# Patient Record
Sex: FEMALE | Race: AMERICAN INDIAN OR ALASKA NATIVE
[De-identification: names, ages, dates, MRNs, and addresses within clinical notes are randomized per-mention and may not be internally consistent; named-entity substitution may affect disease eponyms.]

---

## 2017-11-06 ENCOUNTER — HOSPITAL ENCOUNTER (EMERGENCY)
Dept: HOSPITAL 5 - ED | Age: 27
Discharge: HOME | End: 2017-11-06
Payer: SELF-PAY

## 2017-11-06 VITALS — DIASTOLIC BLOOD PRESSURE: 68 MMHG | SYSTOLIC BLOOD PRESSURE: 100 MMHG

## 2017-11-06 DIAGNOSIS — F12.10: ICD-10-CM

## 2017-11-06 DIAGNOSIS — R82.5: ICD-10-CM

## 2017-11-06 DIAGNOSIS — E87.6: ICD-10-CM

## 2017-11-06 DIAGNOSIS — E05.90: ICD-10-CM

## 2017-11-06 DIAGNOSIS — B34.9: Primary | ICD-10-CM

## 2017-11-06 LAB
ALBUMIN SERPL-MCNC: 4.2 G/DL (ref 3.9–5)
ALBUMIN/GLOB SERPL: 1.1 %
ALP SERPL-CCNC: 63 UNITS/L (ref 35–129)
ALT SERPL-CCNC: 11 UNITS/L (ref 7–56)
ANION GAP SERPL CALC-SCNC: 21 MMOL/L
BACTERIA #/AREA URNS HPF: (no result) /HPF
BASOPHILS NFR BLD AUTO: 0.5 % (ref 0–1.8)
BILIRUB DIRECT SERPL-MCNC: 0.2 MG/DL (ref 0–0.2)
BILIRUB INDIRECT SERPL-MCNC: 0.9 MG/DL
BILIRUB SERPL-MCNC: 1.1 MG/DL (ref 0.1–1.2)
BILIRUB UR QL STRIP: (no result)
BLOOD UR QL VISUAL: (no result)
BUN SERPL-MCNC: 7 MG/DL (ref 7–17)
BUN/CREAT SERPL: 9 %
CALCIUM SERPL-MCNC: 9.2 MG/DL (ref 8.4–10.2)
CHLORIDE SERPL-SCNC: 95.7 MMOL/L (ref 98–107)
CO2 SERPL-SCNC: 23 MMOL/L (ref 22–30)
EOSINOPHIL NFR BLD AUTO: 0.1 % (ref 0–4.3)
GLUCOSE SERPL-MCNC: 109 MG/DL (ref 65–100)
HCT VFR BLD CALC: 36.3 % (ref 30.3–42.9)
HGB BLD-MCNC: 11.9 GM/DL (ref 10.1–14.3)
KETONES UR STRIP-MCNC: (no result) MG/DL
LEUKOCYTE ESTERASE UR QL STRIP: (no result)
MCH RBC QN AUTO: 30 PG (ref 28–32)
MCHC RBC AUTO-ENTMCNC: 33 % (ref 30–34)
MCV RBC AUTO: 90 FL (ref 79–97)
NITRITE UR QL STRIP: (no result)
PH UR STRIP: 6 [PH] (ref 5–7)
PLATELET # BLD: 257 K/MM3 (ref 140–440)
POTASSIUM SERPL-SCNC: 3.3 MMOL/L (ref 3.6–5)
PROT SERPL-MCNC: 8.1 G/DL (ref 6.3–8.2)
RBC # BLD AUTO: 4.03 M/MM3 (ref 3.65–5.03)
RBC #/AREA URNS HPF: 13 /HPF (ref 0–6)
SODIUM SERPL-SCNC: 136 MMOL/L (ref 137–145)
UROBILINOGEN UR-MCNC: 4 MG/DL (ref ?–2)
WBC # BLD AUTO: 14.6 K/MM3 (ref 4.5–11)
WBC #/AREA URNS HPF: > 182 /HPF (ref 0–6)

## 2017-11-06 PROCEDURE — 93005 ELECTROCARDIOGRAM TRACING: CPT

## 2017-11-06 PROCEDURE — 36415 COLL VENOUS BLD VENIPUNCTURE: CPT

## 2017-11-06 PROCEDURE — 80048 BASIC METABOLIC PNL TOTAL CA: CPT

## 2017-11-06 PROCEDURE — 71020: CPT

## 2017-11-06 PROCEDURE — 80074 ACUTE HEPATITIS PANEL: CPT

## 2017-11-06 PROCEDURE — 93010 ELECTROCARDIOGRAM REPORT: CPT

## 2017-11-06 PROCEDURE — 87400 INFLUENZA A/B EACH AG IA: CPT

## 2017-11-06 PROCEDURE — 81001 URINALYSIS AUTO W/SCOPE: CPT

## 2017-11-06 PROCEDURE — 96374 THER/PROPH/DIAG INJ IV PUSH: CPT

## 2017-11-06 PROCEDURE — 99284 EMERGENCY DEPT VISIT MOD MDM: CPT

## 2017-11-06 PROCEDURE — 96361 HYDRATE IV INFUSION ADD-ON: CPT

## 2017-11-06 PROCEDURE — 81025 URINE PREGNANCY TEST: CPT

## 2017-11-06 PROCEDURE — 85025 COMPLETE CBC W/AUTO DIFF WBC: CPT

## 2017-11-06 NOTE — EMERGENCY DEPARTMENT REPORT
- General


Chief Complaint: Upper Respiratory Infection


Stated Complaint: FLU LIKE SYMPTOMS


Time Seen by Provider: 17 15:49


Source: patient


Mode of arrival: Ambulatory


Limitations: No Limitations





- History of Present Illness


Initial Comments: 





27-year-old female with a past medical history of hyperthyroidism (subclinical 

and not currently on medication) presents to the hospital with complaints of 

flulike symptoms for the past 4-5 days.  Positive cough productive of yellow 

sputum, intermittent fevers, decreased food intake for the past 2 days.  

Yesterday patient had an episode of profuse sweating and near-syncope.  She 

does not obtain a sneeze at this time.  She complains of anterior chest pain 

with coughing that improved at the taken Tylenol.  Last night patient had 

several episodes of vomiting.  No diarrhea or abdominal pain reported.  No sick 

contacts or recent travel.  Patient is currently on her menstrual cycle.  She 

did not receive a flu shot





- Related Data


 Previous Rx's











 Medication  Instructions  Recorded  Last Taken  Type


 


Loratadine [Claritin] 10 mg PO DAILY #30 tablet 10/24/13 Unknown Rx


 


RX: Prednisone 20 mg PO QDAY #5 tablet 10/24/13 Unknown Rx


 


RX: Triamcinolone 0.1% [Kenalog 1 applic TP TID #60 tube 10/24/13 Unknown Rx





0.1% CREAM]    


 


hydrOXYzine HCL [Atarax] 25 mg PO Q6HR PRN #30 tablet 10/24/13 Unknown Rx


 


Benzonatate [Tessalon Perles] 100 mg PO Q8HR #30 capsule 17 Unknown Rx


 


Cephalexin [Keflex] 500 mg PO Q12HR #14 cap 17 Unknown Rx


 


Ibuprofen [Motrin] 600 mg PO Q8H PRN #30 tablet 17 Unknown Rx


 


Ondansetron [Zofran Odt] 4 mg PO Q8HR PRN #20 tab.rapdis 17 Unknown Rx











 Allergies











Allergy/AdvReac Type Severity Reaction Status Date / Time


 


No Known Allergies Allergy   Unverified 10/24/13 08:46














ED Review of Systems


ROS: 


Stated complaint: FLU LIKE SYMPTOMS


Other details as noted in HPI





Comment: All other systems reviewed and negative


Other: 





Constitutional: As per HPI


ENT: No ear pain or throat pain


Neck: Denies pain


Respiratory:as per hpi


Cardiovascular: Denies  palpitations, syncope


GI: Denies abdominal pain, diarrhea


: Denies dysuria


Musculoskeletal: Denies back pain, joint swelling 


Skin: Denies rash, lesions, erythema


Neurologic: Denies headache, numbness, weakness


Psychiatric: Denies suicidal ideation, hallucinations








ED Past Medical Hx





- Past Medical History


Previous Medical History?: Yes


Additional medical history: hyperthyroidism, eczema





- Surgical History


Past Surgical History?: Yes


Additional Surgical History:  x 2





- Social History


Smoking Status: Former Smoker


Substance Use Type: Alcohol, Marijuana, Prescribed, Other





- Medications


Home Medications: 


 Home Medications











 Medication  Instructions  Recorded  Confirmed  Last Taken  Type


 


Loratadine [Claritin] 10 mg PO DAILY #30 tablet 10/24/13  Unknown Rx


 


RX: Prednisone 20 mg PO QDAY #5 tablet 10/24/13  Unknown Rx


 


RX: Triamcinolone 0.1% [Kenalog 1 applic TP TID #60 tube 10/24/13  Unknown Rx





0.1% CREAM]     


 


hydrOXYzine HCL [Atarax] 25 mg PO Q6HR PRN #30 tablet 10/24/13  Unknown Rx


 


Benzonatate [Tessalon Perles] 100 mg PO Q8HR #30 capsule 17  Unknown Rx


 


Cephalexin [Keflex] 500 mg PO Q12HR #14 cap 17  Unknown Rx


 


Ibuprofen [Motrin] 600 mg PO Q8H PRN #30 tablet 17  Unknown Rx


 


Ondansetron [Zofran Odt] 4 mg PO Q8HR PRN #20 tab.rapdis 17  Unknown Rx














ED Physical Exam





- General


Limitations: No Limitations





- Other


Other exam information: 





General: No limitations, patient is alert in no acute distress


Head exam: Atraumatic, normocephalic


Eyes exam: Normal appearance, pupils equal reactive to light, extraocular 

movements intact


ENT: Moist mucous membrane, normal oropharynx, no exudate


Neck exam: Normal inspection, full range of motion, no meningismus nontender


Respiratory exam: Clear to auscultation bilateral, no wheezes, rales, crackles


Cardiovascular: Normal rate and rhythm, normal heart sounds


Abdomen: Soft, nondistended, and  nontender, with normal bowel sounds, no 

rebound, or guarding


Extremity: Full range of motion normal inspection no deformity


Back: Normal Inspection, full range of motion, no tenderness


Neurologic: Alert, oriented x3, cranial nerves intact, no motor or sensory 

deficit


Psychiatric: normal affect, normal mood


Skin: Warm, dry, intact





ED Course


 Vital Signs











  17





  12:40 16:29 17:55


 


Temperature 100.7 F H 98.1 F 97.9 F


 


Pulse Rate 107 H  95 H


 


Respiratory 16  16





Rate   


 


Blood Pressure 112/80  


 


Blood Pressure   117/79





[Right]   


 


O2 Sat by Pulse 100  100





Oximetry   














- Reevaluation(s)


Reevaluation #1: 





17 16:34


Patient treated with normal saline, Toradol, Keflex





ED Medical Decision Making





- Lab Data


Result diagrams: 


 17 12:45





 17 12:45








 Lab Results











  17 Range/Units





  12:45 12:45 13:27 


 


WBC  14.6 H    (4.5-11.0)  K/mm3


 


RBC  4.03    (3.65-5.03)  M/mm3


 


Hgb  11.9    (10.1-14.3)  gm/dl


 


Hct  36.3    (30.3-42.9)  %


 


MCV  90    (79-97)  fl


 


MCH  30    (28-32)  pg


 


MCHC  33    (30-34)  %


 


RDW  14.5    (13.2-15.2)  %


 


Plt Count  257    (140-440)  K/mm3


 


Lymph % (Auto)  12.6 L    (13.4-35.0)  %


 


Mono % (Auto)  14.7 H    (0.0-7.3)  %


 


Eos % (Auto)  0.1    (0.0-4.3)  %


 


Baso % (Auto)  0.5    (0.0-1.8)  %


 


Lymph #  1.8    (1.2-5.4)  K/mm3


 


Mono #  2.2 H    (0.0-0.8)  K/mm3


 


Eos #  0.0    (0.0-0.4)  K/mm3


 


Baso #  0.1    (0.0-0.1)  K/mm3


 


Seg Neutrophils %  72.1 H    (40.0-70.0)  %


 


Seg Neutrophils #  10.5 H    (1.8-7.7)  K/mm3


 


Sodium   136 L   (137-145)  mmol/L


 


Potassium   3.3 L   (3.6-5.0)  mmol/L


 


Chloride   95.7 L   ()  mmol/L


 


Carbon Dioxide   23   (22-30)  mmol/L


 


Anion Gap   21   mmol/L


 


BUN   7   (7-17)  mg/dL


 


Creatinine   0.8   (0.7-1.2)  mg/dL


 


Estimated GFR   > 60   ml/min


 


BUN/Creatinine Ratio   9   %


 


Glucose   109 H   ()  mg/dL


 


Calcium   9.2   (8.4-10.2)  mg/dL


 


Total Bilirubin     (0.1-1.2)  mg/dL


 


Direct Bilirubin     (0-0.2)  mg/dL


 


Indirect Bilirubin     mg/dL


 


AST     (5-40)  units/L


 


ALT     (7-56)  units/L


 


Alkaline Phosphatase     ()  units/L


 


Total Protein     (6.3-8.2)  g/dL


 


Albumin     (3.9-5)  g/dL


 


Albumin/Globulin Ratio     %


 


Urine Color    Yellow  (Yellow)  


 


Urine Turbidity    Clear  (Clear)  


 


Urine pH    6.0  (5.0-7.0)  


 


Ur Specific Gravity    1.006  (1.003-1.030)  


 


Urine Protein    30 mg/dl  (Negative)  mg/dL


 


Urine Glucose (UA)    Neg  (Negative)  mg/dL


 


Urine Ketones    Neg  (Negative)  mg/dL


 


Urine Blood    Lg  (Negative)  


 


Urine Nitrite    Neg  (Negative)  


 


Urine Bilirubin    Neg  (Negative)  


 


Urine Urobilinogen    4.0  (<2.0)  mg/dL


 


Ur Leukocyte Esterase    Lg  (Negative)  


 


Urine WBC (Auto)    > 182.0 H  (0.0-6.0)  /HPF


 


Urine RBC (Auto)    13.0  (0.0-6.0)  /HPF


 


U Epithel Cells (Auto)    10.0  (0-13.0)  /HPF


 


Urine Bacteria (Auto)    1+  (Negative)  /HPF


 


Ur Transition Epith Cell    1  /HPF


 


Urine Yeast (Budding)    2+  /HPF


 


Urine HCG, Qual     (Negative)  














  17 Range/Units





  13:27 16:08 


 


WBC    (4.5-11.0)  K/mm3


 


RBC    (3.65-5.03)  M/mm3


 


Hgb    (10.1-14.3)  gm/dl


 


Hct    (30.3-42.9)  %


 


MCV    (79-97)  fl


 


MCH    (28-32)  pg


 


MCHC    (30-34)  %


 


RDW    (13.2-15.2)  %


 


Plt Count    (140-440)  K/mm3


 


Lymph % (Auto)    (13.4-35.0)  %


 


Mono % (Auto)    (0.0-7.3)  %


 


Eos % (Auto)    (0.0-4.3)  %


 


Baso % (Auto)    (0.0-1.8)  %


 


Lymph #    (1.2-5.4)  K/mm3


 


Mono #    (0.0-0.8)  K/mm3


 


Eos #    (0.0-0.4)  K/mm3


 


Baso #    (0.0-0.1)  K/mm3


 


Seg Neutrophils %    (40.0-70.0)  %


 


Seg Neutrophils #    (1.8-7.7)  K/mm3


 


Sodium    (137-145)  mmol/L


 


Potassium    (3.6-5.0)  mmol/L


 


Chloride    ()  mmol/L


 


Carbon Dioxide    (22-30)  mmol/L


 


Anion Gap    mmol/L


 


BUN    (7-17)  mg/dL


 


Creatinine    (0.7-1.2)  mg/dL


 


Estimated GFR    ml/min


 


BUN/Creatinine Ratio    %


 


Glucose    ()  mg/dL


 


Calcium    (8.4-10.2)  mg/dL


 


Total Bilirubin   1.10  (0.1-1.2)  mg/dL


 


Direct Bilirubin   0.2  (0-0.2)  mg/dL


 


Indirect Bilirubin   0.9  mg/dL


 


AST   18  (5-40)  units/L


 


ALT   11  (7-56)  units/L


 


Alkaline Phosphatase   63  ()  units/L


 


Total Protein   8.1  (6.3-8.2)  g/dL


 


Albumin   4.2  (3.9-5)  g/dL


 


Albumin/Globulin Ratio   1.1  %


 


Urine Color    (Yellow)  


 


Urine Turbidity    (Clear)  


 


Urine pH    (5.0-7.0)  


 


Ur Specific Gravity    (1.003-1.030)  


 


Urine Protein    (Negative)  mg/dL


 


Urine Glucose (UA)    (Negative)  mg/dL


 


Urine Ketones    (Negative)  mg/dL


 


Urine Blood    (Negative)  


 


Urine Nitrite    (Negative)  


 


Urine Bilirubin    (Negative)  


 


Urine Urobilinogen    (<2.0)  mg/dL


 


Ur Leukocyte Esterase    (Negative)  


 


Urine WBC (Auto)    (0.0-6.0)  /HPF


 


Urine RBC (Auto)    (0.0-6.0)  /HPF


 


U Epithel Cells (Auto)    (0-13.0)  /HPF


 


Urine Bacteria (Auto)    (Negative)  /HPF


 


Ur Transition Epith Cell    /HPF


 


Urine Yeast (Budding)    /HPF


 


Urine HCG, Qual  Negative   (Negative)  








influenza neg





- EKG Data


-: EKG Interpreted by Me


EKG shows normal: sinus rhythm, axis (qrs 24), QRS complexes (99), ST-T waves (

no stemi/t inv)


Rate: normal





- EKG Data


When compared to previous EKG there are: previous EKG unavailable





- Radiology Data


Radiology results: image reviewed (cxr:  naf)





- Medical Decision Making





Chest x-ray unremarkable, no leukocytosis, I suspect viral syndrome.  

Improvement with each treatment.  Potassium supplemented.  UA reveals 

significant increased WBC count however, patient is on menstrual cycle.  She 

denies urinary symptoms since were unable to obtain a clean-catch at this time 

we will cover with antibiotics for UTI. Pt drinking plenty of fluid in ed





- Differential Diagnosis


bronchitis, viral syndrome, pneumonia, UTI, dehydration, anemia, vasovagal


Critical Care Time: No


Critical care attestation.: 


If time is entered above; I have spent that time in minutes in the direct care 

of this critically ill patient, excluding procedure time.








ED Disposition


Clinical Impression: 


 Viral syndrome, Hypokalemia, Urine WBC increased





Disposition:  TO HOME OR SELFCARE


Is pt being admited?: No


Does the pt Need Aspirin: No


Condition: Stable


Instructions:  Urinary Tract Infection in Women (ED), Hypokalemia (ED), Viral 

Syndrome (ED)


Additional Instructions: 


Take the medication as prescribed.  Follow up with your doctor or the doctor/

clinic provided.  Return if symptoms worsen.


Prescriptions: 


Benzonatate [Tessalon Perles] 100 mg PO Q8HR #30 capsule


Cephalexin [Keflex] 500 mg PO Q12HR #14 cap


Ibuprofen [Motrin] 600 mg PO Q8H PRN #30 tablet


 PRN Reason: Pain


Ondansetron [Zofran Odt] 4 mg PO Q8HR PRN #20 tab.rapdis


 PRN Reason: Nausea And Vomiting


Referrals: 


PRIMARY CARE,MD [Primary Care Provider] - 3-5 Days


[facility] [Provider Group] - 3-5 Days


GER SHAW MD [Staff Physician] - 3-5 Days


Time of Disposition: 18:01

## 2017-11-07 NOTE — XRAY REPORT
ROUTINE CHEST, TWO VIEWS:



History: Cough and fever.

PA and lateral views demonstrate the heart and mediastinal

contour to be of normal size and shape.  The lungs are clear and fully 

expanded and the soft tissues and bony structures are normal.



IMPRESSION:

Normal study.

## 2018-07-29 ENCOUNTER — HOSPITAL ENCOUNTER (EMERGENCY)
Dept: HOSPITAL 5 - ED | Age: 28
Discharge: HOME | End: 2018-07-29
Payer: COMMERCIAL

## 2018-07-29 VITALS — SYSTOLIC BLOOD PRESSURE: 128 MMHG | DIASTOLIC BLOOD PRESSURE: 76 MMHG
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
## 2018-07-29 DIAGNOSIS — Z79.899: ICD-10-CM

## 2018-07-29 DIAGNOSIS — S02.2XXA: Primary | ICD-10-CM

## 2018-07-29 DIAGNOSIS — F17.200: ICD-10-CM
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
## 2018-07-29 DIAGNOSIS — E05.90: ICD-10-CM

## 2018-07-29 DIAGNOSIS — J32.0: ICD-10-CM

## 2018-07-29 DIAGNOSIS — S01.511A: ICD-10-CM

## 2018-07-29 DIAGNOSIS — J33.8: ICD-10-CM
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
## 2018-07-29 PROCEDURE — 70486 CT MAXILLOFACIAL W/O DYE: CPT
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
## 2018-07-29 NOTE — CAT SCAN REPORT
FINAL REPORT



PROCEDURE:  CT FACIAL BONES WO CON



TECHNIQUE:  Computerized tomography of the facial bones and soft

tissues with axial and coronal sections performed from the

cranial aspect of the frontal sinuses to the caudal portion of

the mandible without contrast material. 



HISTORY:  fall with nasal swelling, pain, missing incisors 



COMPARISON:  No prior studies are available for comparison.



FINDINGS:  

There is mild cortical irregularity in the left side of the nasal

bone on image 36 series 4 although I do not see a discrete

fracture line. Correlation with physical exam is recommended to

exclude acute fracture in this area. The orbits, the zygomas and

zygomatic arches as well as the walls of the paranasal sinuses

and mandible appear intact. Metallic density is present in the

patient's tongue which I suspect represents body jewelry. 



There is nodular mucosal thickening in both maxillary sinuses.

There is a large smooth nodular density in the inferior aspect of

the right maxillary sinus measuring approximately 3.4 centimeters

suggesting a mucous retention cyst. There is nodular mucosal

thickening in the medial aspect of the right frontal sinus.

Paranasal sinuses otherwise are clear. 



IMPRESSION:  

Mild cortical irregularity left side of the nasal bone as

described. Please see above image reference. Correlation with

physical exam recommended to exclude an acute fracture. This

could represent an old fracture. No other fractures are

suspected. 



Moderate paranasal sinus disease as described.

## 2019-10-13 ENCOUNTER — HOSPITAL ENCOUNTER (EMERGENCY)
Dept: HOSPITAL 5 - ED | Age: 29
Discharge: HOME | End: 2019-10-13
Payer: SELF-PAY

## 2019-10-13 VITALS — SYSTOLIC BLOOD PRESSURE: 144 MMHG | DIASTOLIC BLOOD PRESSURE: 93 MMHG

## 2019-10-13 DIAGNOSIS — F12.10: ICD-10-CM

## 2019-10-13 DIAGNOSIS — Z79.899: ICD-10-CM

## 2019-10-13 DIAGNOSIS — E05.90: ICD-10-CM

## 2019-10-13 DIAGNOSIS — F17.200: ICD-10-CM

## 2019-10-13 DIAGNOSIS — N76.0: Primary | ICD-10-CM

## 2019-10-13 DIAGNOSIS — Z20.2: ICD-10-CM

## 2019-10-13 LAB
BACTERIA #/AREA URNS HPF: (no result) /HPF
BILIRUB UR QL STRIP: (no result)
BLOOD UR QL VISUAL: (no result)
MUCOUS THREADS #/AREA URNS HPF: (no result) /HPF
PH UR STRIP: 6 [PH] (ref 5–7)
PROT UR STRIP-MCNC: (no result) MG/DL
RBC #/AREA URNS HPF: 8 /HPF (ref 0–6)
UROBILINOGEN UR-MCNC: 2 MG/DL (ref ?–2)
WBC #/AREA URNS HPF: 23 /HPF (ref 0–6)

## 2019-10-13 PROCEDURE — 87086 URINE CULTURE/COLONY COUNT: CPT

## 2019-10-13 PROCEDURE — 87591 N.GONORRHOEAE DNA AMP PROB: CPT

## 2019-10-13 PROCEDURE — 81001 URINALYSIS AUTO W/SCOPE: CPT

## 2019-10-13 PROCEDURE — 87210 SMEAR WET MOUNT SALINE/INK: CPT

## 2019-10-13 PROCEDURE — 81025 URINE PREGNANCY TEST: CPT

## 2019-10-13 PROCEDURE — 96372 THER/PROPH/DIAG INJ SC/IM: CPT

## 2019-10-13 PROCEDURE — 99284 EMERGENCY DEPT VISIT MOD MDM: CPT

## 2019-10-13 NOTE — EMERGENCY DEPARTMENT REPORT
ED Female  HPI





- General


Chief complaint: Urogenital-Female


Stated complaint: STD/VAGINAL DISCHARGE


Time Seen by Provider: 10/13/19 06:38


Source: patient


Mode of arrival: Ambulatory


Limitations: No Limitations





- History of Present Illness


Initial comments: 


Patient is a 29-year-old -American female who presents for vaginal 

discharge advises partner visor today  that she recently treated for an STD.  

Patient states that his discharges white cottage cheese like with some malodor. 

There is no fever no chills no nausea vomiting no abdominal pain no back pain 

last menstrual period 2 weeks ago. 





MD Complaint: vaginal discharge


Onset/Timing: 3


-: days(s)


Radiation: non-radiating


Severity: moderate


Severity scale (0 -10): 3


Quality: other (irritation )


Consistency: constant


Improves with: none


Worsens with: none


Are you Pregnant Now?: No


Last Menstrual Period: 10/01/19


EDC: 20


Associated Symptoms: vaginal discharge





- Related Data


Sexually active: Yes


: 3


Para: 3


A: 0


                                  Previous Rx's











 Medication  Instructions  Recorded  Last Taken  Type


 


Prednisone 20 mg PO QDAY #5 tablet 10/24/13 Unknown Rx


 


Triamcinolone 0.1% [Kenalog 0.1% 1 applic TP TID #60 tube 10/24/13 Unknown Rx





CREAM]    


 


hydrOXYzine HCL [Atarax] 25 mg PO Q6HR PRN #30 tablet 10/24/13 Unknown Rx


 


Benzonatate [Tessalon Perles] 100 mg PO Q8HR #30 capsule 17 Unknown Rx


 


Ondansetron [Zofran Odt] 4 mg PO Q8HR PRN #20 tab.rapdis 17 Unknown Rx


 


cephALEXin [Keflex] 500 mg PO Q12HR #14 cap 17 Unknown Rx


 


Fluticasone [Flonase] 1 spray NS QDAY 14 Days #1 bottle 18 Unknown Rx


 


Ibuprofen [Motrin 600 MG tab] 600 mg PO Q8H PRN #15 tablet 18 Unknown Rx


 


Loratadine [Claritin] 10 mg PO DAILY 15 Days #15 tablet 18 Unknown Rx


 


cephALEXin [Keflex] 500 mg PO Q8HR 10 Days #30 cap 18 Unknown Rx


 


Fluconazole [Diflucan TAB] 150 mg PO ONCE #1 tablet 10/13/19 Unknown Rx











                                    Allergies











Allergy/AdvReac Type Severity Reaction Status Date / Time


 


No Known Allergies Allergy   Unverified 10/24/13 08:46














ED Review of Systems


ROS: 


Stated complaint: STD/VAGINAL DISCHARGE


Other details as noted in HPI





Constitutional: denies: chills, fever


Eyes: denies: eye pain, eye discharge, vision change


ENT: denies: ear pain, throat pain


Respiratory: denies: cough, shortness of breath, wheezing


Cardiovascular: denies: chest pain, palpitations


Endocrine: no symptoms reported


Gastrointestinal: denies: abdominal pain, nausea, diarrhea


Genitourinary: discharge.  denies: urgency, dysuria, frequency, hematuria


Musculoskeletal: denies: back pain, joint swelling, arthralgia


Skin: denies: rash, lesions


Neurological: denies: headache, weakness, paresthesias


Psychiatric: denies: anxiety, depression


Hematological/Lymphatic: denies: easy bleeding, easy bruising





ED Past Medical Hx





- Past Medical History


Previous Medical History?: Yes


Additional medical history: hyperthyroidism, eczema, Low BP





- Surgical History


Past Surgical History?: Yes


Additional Surgical History:  x 2





- Social History


Smoking Status: Current Every Day Smoker


Substance Use Type: Alcohol, Marijuana





- Medications


Home Medications: 


                                Home Medications











 Medication  Instructions  Recorded  Confirmed  Last Taken  Type


 


Prednisone 20 mg PO QDAY #5 tablet 10/24/13  Unknown Rx


 


Triamcinolone 0.1% [Kenalog 0.1% 1 applic TP TID #60 tube 10/24/13  Unknown Rx





CREAM]     


 


hydrOXYzine HCL [Atarax] 25 mg PO Q6HR PRN #30 tablet 10/24/13  Unknown Rx


 


Benzonatate [Tessalon Perles] 100 mg PO Q8HR #30 capsule 17  Unknown Rx


 


Ondansetron [Zofran Odt] 4 mg PO Q8HR PRN #20 tab.rapdis 17  Unknown Rx


 


cephALEXin [Keflex] 500 mg PO Q12HR #14 cap 17  Unknown Rx


 


Fluticasone [Flonase] 1 spray NS QDAY 14 Days #1 bottle 18  Unknown Rx


 


Ibuprofen [Motrin 600 MG tab] 600 mg PO Q8H PRN #15 tablet 18  Unknown Rx


 


Loratadine [Claritin] 10 mg PO DAILY 15 Days #15 tablet 07/29/18  Unknown Rx


 


cephALEXin [Keflex] 500 mg PO Q8HR 10 Days #30 cap 18  Unknown Rx


 


Fluconazole [Diflucan TAB] 150 mg PO ONCE #1 tablet 10/13/19  Unknown Rx














ED Physical Exam





- General


Limitations: No Limitations


General appearance: alert, in no apparent distress





- Head


Head exam: Present: atraumatic, normocephalic





- Eye


Eye exam: Present: normal appearance, PERRL, EOMI


Pupils: Present: normal accommodation





- ENT


ENT exam: Present: mucous membranes moist





- Neck


Neck exam: Present: normal inspection, full ROM





- Respiratory


Respiratory exam: Present: normal lung sounds bilaterally.  Absent: respiratory 

distress, wheezes, stridor, chest wall tenderness





- Cardiovascular


Cardiovascular Exam: Present: regular rate, normal rhythm, normal heart sounds. 

Absent: systolic murmur, diastolic murmur, rubs, gallop





- GI/Abdominal


GI/Abdominal exam: Present: soft, normal bowel sounds.  Absent: distended, 

tenderness, bruit, hernia





- Rectal


Rectal exam: Present: deferred





- 


External exam: Present: normal external exam


Speculum exam: Present: erythema, vaginal discharge (white thick ).  Absent: 

cervical discharge, vaginal bleeding, foreign body, tissue, laceration


Bi-manual exam: Absent: cervical motion tendernes





- Extremities Exam


Extremities exam: Present: normal inspection, full ROM.  Absent: tenderness





- Back Exam


Back exam: Present: normal inspection, full ROM.  Absent: tenderness, CVA 

tenderness (R), CVA tenderness (L), rash noted





- Neurological Exam


Neurological exam: Present: alert, oriented X3





- Psychiatric


Psychiatric exam: Present: normal affect, normal mood





- Skin


Skin exam: Present: warm, dry, intact, normal color.  Absent: rash





ED Course





                                   Vital Signs











  10/13/19





  05:53


 


Temperature 98.5 F


 


Pulse Rate 111 H


 


Respiratory 20





Rate 


 


Blood Pressure 144/93


 


O2 Sat by Pulse 99





Oximetry 














ED Medical Decision Making





- Medical Decision Making


 plan, tx for STI exposure,  pt given rocephin and azithromycin in ed, if not 

pregnant will receive flagyl po 2gm, if pregnant Flagyl Gel intravaginal, pt 

will follow up with Health department for HIV, HSV, and Syphilis screening. Will

return o 3 days for GC/Ch Culture results. pt verbalized agreement and 

understanding with discharge plan.  





Critical care attestation.: 


If time is entered above; I have spent that time in minutes in the direct care 

of this critically ill patient, excluding procedure time.








ED Disposition


Clinical Impression: 


 Exposure to STD





Vaginitis


Qualifiers:


 Chronicity: acute Qualified Code(s): N76.0 - Acute vaginitis





Disposition: - TO HOME OR SELFCARE


Is pt being admited?: No


Does the pt Need Aspirin: No


Condition: Stable


Instructions:  Sexually Transmitted Diseases (ED), Vaginitis (ED)


Additional Instructions: 


Follow up with [facility] in 2-3 days for HIV , syphilis, and HSV 

screening as dicusded.  Return in 3 days for results of Gonorrhea and Chlamydia 

Cultures.  


Prescriptions: 


Fluconazole [Diflucan TAB] 150 mg PO ONCE #1 tablet


Referrals: 


[facility] [Outside] - 3-5 Days


Forms:  Work/School Release Form(ED)


Time of Disposition: 06:54

## 2020-03-12 ENCOUNTER — HOSPITAL ENCOUNTER (EMERGENCY)
Dept: HOSPITAL 5 - ED | Age: 30
Discharge: LEFT BEFORE DISCHARGE | End: 2020-03-12
Payer: SELF-PAY

## 2020-03-12 VITALS — SYSTOLIC BLOOD PRESSURE: 126 MMHG | DIASTOLIC BLOOD PRESSURE: 86 MMHG

## 2020-03-12 DIAGNOSIS — Z32.00: Primary | ICD-10-CM

## 2020-03-12 PROCEDURE — 99282 EMERGENCY DEPT VISIT SF MDM: CPT

## 2020-03-12 NOTE — EMERGENCY DEPARTMENT REPORT
Chief Complaint: Urogenital-Female


Stated Complaint: PREG TEST


Time Seen by Provider: 03/12/20 20:02





- HPI


History of Present Illness: 





This is a 29-year-old female nontoxic, well in appearance with no signs of 

distress presents to the ED for  pregnancy test.  Stated had a positive 

pregnancy test at home.  Denies any vaginal bleeding or pelvic/abdominal pain.  

Patient stated she is asymptotic.  Denies any vaginal discharge, vaginal pain, 

or swelling.  Patient denies any urinary symptoms. Patient denies any fever, 

chills, headache, nausea, vomiting, chest pain or shortness of breathe.  denies 

any other symptoms or complaints.  Denies any allergies or PMH.





- Exam


Physical Exam: 





normal physical exam


MSE screening note: 


Focused history and physical exam performed.


Due to findings the following was ordered:











ED Medical Decision Making





- Medical Decision Making





This is a 29-year-old female that presents with nonmedical emergency complaint. 

Patient is just requested for a pregnancy test.  Patient denies any symptoms.  

Patient was instructed to Follow-up with a OBGYN doctor in 3-5 days or if 

symptoms worsen and continue return to emergency room as soon as possible.  At 

time of discharge, the patient does not seem toxic or ill in appearance.  No 

acute signs of distress noted.  Patient agrees to discharge treatment plan of 

care.  No further questions noted by the patient.








ED Disposition for MSE


Clinical Impression: 


 Encounter for pregnancy test, result unknown





Disposition: Z-07 MED SCREENING EXAM-LEFT


Is pt being admited?: No


Does the pt Need Aspirin: No


Condition: Stable


Additional Instructions: 


 Follow-up with a OBGYN doctor in 3-5 days or if symptoms worsen and continue 

return to emergency room as soon as possible.  


Referrals: 


[facility] CARE,MD [Referring] - 3-5 Days


THERESA FLORES MD [Staff Physician] - 3-5 Days


[facility] [Provider Group] - 3-5 Days

## 2020-04-08 ENCOUNTER — HOSPITAL ENCOUNTER (EMERGENCY)
Dept: HOSPITAL 5 - ED | Age: 30
LOS: 1 days | Discharge: HOME | End: 2020-04-09
Payer: MEDICAID

## 2020-04-08 DIAGNOSIS — Z79.1: ICD-10-CM

## 2020-04-08 DIAGNOSIS — Z3A.11: ICD-10-CM

## 2020-04-08 DIAGNOSIS — O26.891: Primary | ICD-10-CM

## 2020-04-08 DIAGNOSIS — Z98.890: ICD-10-CM

## 2020-04-08 DIAGNOSIS — O23.41: ICD-10-CM

## 2020-04-08 DIAGNOSIS — F17.200: ICD-10-CM

## 2020-04-08 DIAGNOSIS — R10.32: ICD-10-CM

## 2020-04-08 DIAGNOSIS — O21.8: ICD-10-CM

## 2020-04-08 DIAGNOSIS — Z79.899: ICD-10-CM

## 2020-04-08 DIAGNOSIS — E05.90: ICD-10-CM

## 2020-04-08 DIAGNOSIS — Z79.2: ICD-10-CM

## 2020-04-08 DIAGNOSIS — R10.31: ICD-10-CM

## 2020-04-08 DIAGNOSIS — L30.9: ICD-10-CM

## 2020-04-08 PROCEDURE — 80048 BASIC METABOLIC PNL TOTAL CA: CPT

## 2020-04-08 PROCEDURE — 84702 CHORIONIC GONADOTROPIN TEST: CPT

## 2020-04-08 PROCEDURE — 36415 COLL VENOUS BLD VENIPUNCTURE: CPT

## 2020-04-08 PROCEDURE — 96375 TX/PRO/DX INJ NEW DRUG ADDON: CPT

## 2020-04-08 PROCEDURE — 80076 HEPATIC FUNCTION PANEL: CPT

## 2020-04-08 PROCEDURE — 96374 THER/PROPH/DIAG INJ IV PUSH: CPT

## 2020-04-08 PROCEDURE — 99285 EMERGENCY DEPT VISIT HI MDM: CPT

## 2020-04-08 PROCEDURE — 85025 COMPLETE CBC W/AUTO DIFF WBC: CPT

## 2020-04-08 PROCEDURE — 76801 OB US < 14 WKS SINGLE FETUS: CPT

## 2020-04-08 PROCEDURE — 81001 URINALYSIS AUTO W/SCOPE: CPT

## 2020-04-08 PROCEDURE — 96361 HYDRATE IV INFUSION ADD-ON: CPT

## 2020-04-09 VITALS — DIASTOLIC BLOOD PRESSURE: 56 MMHG | SYSTOLIC BLOOD PRESSURE: 91 MMHG

## 2020-04-09 LAB
ALBUMIN SERPL-MCNC: 3.9 G/DL (ref 3.9–5)
ALT SERPL-CCNC: 12 UNITS/L (ref 7–56)
BACTERIA #/AREA URNS HPF: (no result) /HPF
BASOPHILS # (AUTO): 0 K/MM3 (ref 0–0.1)
BASOPHILS NFR BLD AUTO: 0.4 % (ref 0–1.8)
BILIRUB DIRECT SERPL-MCNC: < 0.2 MG/DL (ref 0–0.2)
BILIRUB UR QL STRIP: (no result)
BLOOD UR QL VISUAL: (no result)
BUN SERPL-MCNC: 6 MG/DL (ref 7–17)
BUN/CREAT SERPL: 9 %
CALCIUM SERPL-MCNC: 9.7 MG/DL (ref 8.4–10.2)
EOSINOPHIL # BLD AUTO: 0 K/MM3 (ref 0–0.4)
EOSINOPHIL NFR BLD AUTO: 0.2 % (ref 0–4.3)
HCT VFR BLD CALC: 34.5 % (ref 30.3–42.9)
HEMOLYSIS INDEX: 0
HGB BLD-MCNC: 11.4 GM/DL (ref 10.1–14.3)
LYMPHOCYTES # BLD AUTO: 1.3 K/MM3 (ref 1.2–5.4)
LYMPHOCYTES NFR BLD AUTO: 15.6 % (ref 13.4–35)
MCHC RBC AUTO-ENTMCNC: 33 % (ref 30–34)
MCV RBC AUTO: 88 FL (ref 79–97)
MONOCYTES # (AUTO): 1 K/MM3 (ref 0–0.8)
MONOCYTES % (AUTO): 11.4 % (ref 0–7.3)
MUCOUS THREADS #/AREA URNS HPF: (no result) /HPF
PH UR STRIP: 6 [PH] (ref 5–7)
PLATELET # BLD: 282 K/MM3 (ref 140–440)
RBC # BLD AUTO: 3.94 M/MM3 (ref 3.65–5.03)
RBC #/AREA URNS HPF: 10 /HPF (ref 0–6)
UROBILINOGEN UR-MCNC: 4 MG/DL (ref ?–2)
WBC #/AREA URNS HPF: > 182 /HPF (ref 0–6)

## 2020-04-09 NOTE — ULTRASOUND REPORT
US OB <= 14 weeks fetus



INDICATION / CLINICAL INFORMATION:

13 week preg. abd pain.



COMPARISON:

None available.



FINDINGS:

Uterus measures 12.5 cm.

A single viable intrauterine gestation is identified with a crown-rump length of 4.6 cm. This corresp
onds to an 11 week 3 day intrauterine gestation.

Fetal cardiac activity was observed with a heart rate of 196 bpm



Both ovaries are normal.





IMPRESSION:

1. Viable single 11 week 3 day intrauterine gestation. 



Signer Name: Jono Avalos MD 

Signed: 4/9/2020 3:56 AM

Workstation Name: [email or web address]

## 2020-04-09 NOTE — EMERGENCY DEPARTMENT REPORT
ED Abdominal Pain HPI





- General


Chief Complaint: Abdominal Pain


Stated Complaint: 13 WKS PREG/ABD PAIN/NAUSEA/EMESIS


Time Seen by Provider: 20 02:41


Source: patient, EMS


Mode of arrival: Ambulatory


Limitations: No Limitations





- History of Present Illness


Initial Comments: 





Patient is a 29-year-old female that presents emergency room with complaints of 

abdominal pain.  Patient states her abdominal pain is bilateral lower.  Patient 

states she is 13 weeks pregnant.  Patient states she has not had an ultrasound 

to confirm pregnancy.  Patient states she had a urine and blood test at her 

OB/GYN's office.  Patient states she is scheduled for an ultrasound next week.  

Patient states that she is also having nausea and vomiting.  Patient states not 

able to hold anything down.  Patient states the pain is a 10 out of 10.  Pain 

states the pain is worse with movement better with rest.  Patient states the 

pain is also worse with palpation of her abdomen.  Patient states she is a G2, 

, twins.  Patient denies vaginal bleeding.  Patient denies vaginal 

discharge.  Patient denies dysuria.








Patient denies recent travel.  Patient denies recent international travel.  

Patient denies exposure to the novel coronavirus.  Patient denies sick contacts.

 Patient denies fever and chills.  Patient denies cough.  Patient denies 

diarrhea.  Patient denies coming in contact with anybody with symptoms of the 

novel coronavirus.


MD Complaint: abdominal pain


-: Sudden


Location: LLQ, RLQ


Radiation: none


Migration to: no migration


Severity: severe


Severity scale (0 -10): 10


Quality: stabbing


Consistency: constant





- Related Data


                                  Previous Rx's











 Medication  Instructions  Recorded  Last Taken  Type


 


Prednisone 20 mg PO QDAY #5 tablet 10/24/13 Unknown Rx


 


Triamcinolone 0.1% [Kenalog 0.1% 1 applic TP TID #60 tube 10/24/13 Unknown Rx





CREAM]    


 


hydrOXYzine HCL [Atarax] 25 mg PO Q6HR PRN #30 tablet 10/24/13 Unknown Rx


 


Benzonatate [Tessalon Perles] 100 mg PO Q8HR #30 capsule 17 Unknown Rx


 


cephALEXin [Keflex] 500 mg PO Q12HR #14 cap 17 Unknown Rx


 


Fluticasone [Flonase] 1 spray NS QDAY 14 Days #1 bottle 18 Unknown Rx


 


Ibuprofen [Motrin 600 MG tab] 600 mg PO Q8H PRN #15 tablet 18 Unknown Rx


 


Loratadine (Nf) [Claritin (Nf)] 10 mg PO DAILY 15 Days #15 tablet 18 

Unknown Rx


 


cephALEXin [Keflex] 500 mg PO Q8HR 10 Days #30 cap 18 Unknown Rx


 


Fluconazole [Diflucan TAB] 150 mg PO ONCE #1 tablet 10/13/19 Unknown Rx


 


Amoxicillin [Amoxicillin TAB] 875 mg PO BID 7 Days #14 tablet 20 Unknown 

Rx


 


Ondansetron [Zofran ODT TAB] 4 mg PO Q6HR PRN #20 tab.rapdis 20 Unknown Rx











                                    Allergies











Allergy/AdvReac Type Severity Reaction Status Date / Time


 


No Known Allergies Allergy   Unverified 10/24/13 08:46














ED Review of Systems


ROS: 


Stated complaint: 13 WKS PREG/ABD PAIN/NAUSEA/EMESIS


Other details as noted in HPI





Constitutional: denies: chills, fever


Eyes: denies: eye pain, eye discharge, vision change


ENT: denies: ear pain, throat pain


Respiratory: denies: cough, shortness of breath, wheezing


Cardiovascular: denies: chest pain, palpitations


Endocrine: no symptoms reported


Gastrointestinal: abdominal pain, nausea, vomiting.  denies: diarrhea


Genitourinary: denies: urgency, dysuria, discharge


Musculoskeletal: denies: back pain, joint swelling, arthralgia


Skin: denies: rash, lesions


Neurological: denies: headache, weakness, paresthesias


Psychiatric: denies: anxiety, depression


Hematological/Lymphatic: denies: easy bleeding, easy bruising





ED Past Medical Hx





- Past Medical History


Previous Medical History?: Yes


Additional medical history: hyperthyroidism, eczema, Low BP





- Surgical History


Past Surgical History?: Yes


Additional Surgical History:  x 2





- Family History


Family history: no significant





- Social History


Smoking Status: Former Smoker


Substance Use Type: None





- Medications


Home Medications: 


                                Home Medications











 Medication  Instructions  Recorded  Confirmed  Last Taken  Type


 


Prednisone 20 mg PO QDAY #5 tablet 10/24/13  Unknown Rx


 


Triamcinolone 0.1% [Kenalog 0.1% 1 applic TP TID #60 tube 10/24/13  Unknown Rx





CREAM]     


 


hydrOXYzine HCL [Atarax] 25 mg PO Q6HR PRN #30 tablet 10/24/13  Unknown Rx


 


Benzonatate [Tessalon Perles] 100 mg PO Q8HR #30 capsule 17  Unknown Rx


 


cephALEXin [Keflex] 500 mg PO Q12HR #14 cap 17  Unknown Rx


 


Fluticasone [Flonase] 1 spray NS QDAY 14 Days #1 bottle 18  Unknown Rx


 


Ibuprofen [Motrin 600 MG tab] 600 mg PO Q8H PRN #15 tablet 18  Unknown Rx


 


Loratadine (Nf) [Claritin (Nf)] 10 mg PO DAILY 15 Days #15 tablet 18  

Unknown Rx


 


cephALEXin [Keflex] 500 mg PO Q8HR 10 Days #30 cap 18  Unknown Rx


 


Fluconazole [Diflucan TAB] 150 mg PO ONCE #1 tablet 10/13/19  Unknown Rx


 


Amoxicillin [Amoxicillin TAB] 875 mg PO BID 7 Days #14 tablet 20  Unknown 

Rx


 


Ondansetron [Zofran ODT TAB] 4 mg PO Q6HR PRN #20 tab.rapdis 20  Unknown 

Rx














ED Physical Exam





- General


Limitations: No Limitations


General appearance: alert, in no apparent distress





- Head


Head exam: Present: atraumatic, normocephalic





- Eye


Eye exam: Present: normal appearance





- ENT


ENT exam: Present: mucous membranes moist





- Neck


Neck exam: Present: normal inspection





- Respiratory


Respiratory exam: Present: normal lung sounds bilaterally.  Absent: respiratory 

distress





- Cardiovascular


Cardiovascular Exam: Present: regular rate, normal rhythm.  Absent: systolic 

murmur, diastolic murmur, rubs, gallop





- GI/Abdominal


GI/Abdominal exam: Present: soft, tenderness, normal bowel sounds





- Extremities Exam


Extremities exam: Present: normal inspection





- Back Exam


Back exam: Present: normal inspection





- Neurological Exam


Neurological exam: Present: alert, oriented X3





- Psychiatric


Psychiatric exam: Present: normal affect, normal mood





- Skin


Skin exam: Present: warm, dry, intact, normal color.  Absent: rash





ED Course


                                   Vital Signs











  20





  22:53 05:19


 


Temperature 98.2 F 98.3 F


 


Pulse Rate 123 H 83


 


Respiratory 18 15





Rate  


 


Blood Pressure 93/46 


 


Blood Pressure  91/56





[Left]  


 


O2 Sat by Pulse 100 99





Oximetry  














- Reevaluation(s)


Reevaluation #1: 


Patient resting.  Patient states her pain has resolved.  Patient states she is 

not feeling nauseous.  Patient tolerated p.o. intake.


20 04:16











Reevaluation #2: 


I discussed all results and clinical findings with patient.  I discussed plan of

 care with patient.  Patient agrees with plan of care.  Patient is stable for 

discharge.  Patient will be discharged home.  Patient given discharge 

instructions.  Patient voiced understanding of discharge instructions.


20 05:49








ED Medical Decision Making





- Lab Data


Result diagrams: 


                                 20 02:59





                                 20 02:59





- Radiology Data


Radiology results: report reviewed








 US OB <= 14 weeks fetus 





INDICATION / CLINICAL INFORMATION: 


13 week preg. abd pain. 





COMPARISON: 


None available. 





FINDINGS: 


Uterus measures 12.5 cm. 


A single viable intrauterine gestation is identified with a crown-rump length of

 4.6 cm. This 


 corresponds to an 11 week 3 day intrauterine gestation. 


Fetal cardiac activity was observed with a heart rate of 196 bpm 





Both ovaries are normal. 








IMPRESSION: 


1. Viable single 11 week 3 day intrauterine gestation. 








- Medical Decision Making





Patient is a 29-year-old female that presents emergency room with lower 

abdominal pain and pregnancy.  Patient's clinical findings are consistent with 

round ligament pain.  Patient had ultrasound done which shows a 11-week IUP.  

Patient's labs unremarkable except for a UTI.  Patient treated with amoxicillin.

  Patient also given Zofran for nausea vomiting.  Patient tolerated p.o. intake 

after pain meds, fluids and Zofran IV.  Patient stable for discharge.  Patient 

discharged home.





- Differential Diagnosis


Miscarriage, ectopic pregnancy, round ligament pain, UTI.


Critical care attestation.: 


If time is entered above; I have spent that time in minutes in the direct care 

of this critically ill patient, excluding procedure time.








ED Disposition


Clinical Impression: 


 Abdominal pain affecting pregnancy, Round ligament pain





Pregnancy


Qualifiers:


 Weeks of gestation: 11 weeks Qualified Code(s): Z3A.11 - 11 weeks gestation of 

pregnancy





Abdominal pain


Qualifiers:


 Abdominal location: lower abdomen, unspecified Qualified Code(s): R10.30 - 

Lower abdominal pain, unspecified





Nausea & vomiting


Qualifiers:


 Vomiting type: unspecified Vomiting Intractability: non-intractable Qualified 

Code(s): R11.2 - Nausea with vomiting, unspecified





UTI (urinary tract infection)


Qualifiers:


 Urinary tract infection type: acute cystitis Hematuria presence: with hematuria

 Qualified Code(s): N30.01 - Acute cystitis with hematuria





Disposition: DC- TO HOME OR SELFCARE


Is pt being admited?: No


Does the pt Need Aspirin: No


Condition: Stable


Instructions:  Threatened Miscarriage (ED), Acute Nausea and Vomiting (ED), 

Abdominal Pain (ED), Abdominal Pain in Pregnancy  (ED)


Additional Instructions: 


Patient to follow-up with primary care in 2 to 3 days.  Patient to follow-up 

with OB/GYN in 2 to 3 days.  Patient to rest.  Patient to increase water.  

Patient to avoid strenuous exercise or heavy lifting until cleared by OB/GYN.  

Nothing per vagina until cleared by OB/GYN.  Patient to eat a brat diet patient 

to take Tylenol as needed for pain.  Patient to take meds as directed.  Patient 

to return to the ER if condition worsens, changes or new symptoms arise.


Prescriptions: 


Amoxicillin [Amoxicillin TAB] 875 mg PO BID 7 Days #14 tablet


Ondansetron [Zofran ODT TAB] 4 mg PO Q6HR PRN #20 tab.rapdis


 PRN Reason: Nausea And Vomiting


Referrals: 


PRIMARY CARE,MD [Primary Care Provider] - 2-3 Days


Time of Disposition: 05:15

## 2020-05-09 ENCOUNTER — HOSPITAL ENCOUNTER (EMERGENCY)
Dept: HOSPITAL 5 - ED | Age: 30
Discharge: HOME | End: 2020-05-09
Payer: MEDICAID

## 2020-05-09 DIAGNOSIS — Z79.899: ICD-10-CM

## 2020-05-09 DIAGNOSIS — E05.90: ICD-10-CM

## 2020-05-09 DIAGNOSIS — O99.282: ICD-10-CM

## 2020-05-09 DIAGNOSIS — Z3A.12: ICD-10-CM

## 2020-05-09 DIAGNOSIS — O20.9: Primary | ICD-10-CM

## 2020-05-09 DIAGNOSIS — O26.892: ICD-10-CM

## 2020-05-09 DIAGNOSIS — R10.9: ICD-10-CM

## 2020-05-09 DIAGNOSIS — Z98.890: ICD-10-CM

## 2020-05-09 LAB
BASOPHILS # (AUTO): 0 K/MM3 (ref 0–0.1)
BASOPHILS NFR BLD AUTO: 0.6 % (ref 0–1.8)
EOSINOPHIL # BLD AUTO: 0.3 K/MM3 (ref 0–0.4)
EOSINOPHIL NFR BLD AUTO: 5.2 % (ref 0–4.3)
HCT VFR BLD CALC: 32.1 % (ref 30.3–42.9)
HGB BLD-MCNC: 10.7 GM/DL (ref 10.1–14.3)
LYMPHOCYTES # BLD AUTO: 1.8 K/MM3 (ref 1.2–5.4)
LYMPHOCYTES NFR BLD AUTO: 27.2 % (ref 13.4–35)
MCHC RBC AUTO-ENTMCNC: 33 % (ref 30–34)
MCV RBC AUTO: 88 FL (ref 79–97)
MONOCYTES # (AUTO): 0.5 K/MM3 (ref 0–0.8)
MONOCYTES % (AUTO): 8.3 % (ref 0–7.3)
PLATELET # BLD: 285 K/MM3 (ref 140–440)
RBC # BLD AUTO: 3.65 M/MM3 (ref 3.65–5.03)

## 2020-05-09 PROCEDURE — 84702 CHORIONIC GONADOTROPIN TEST: CPT

## 2020-05-09 PROCEDURE — 85025 COMPLETE CBC W/AUTO DIFF WBC: CPT

## 2020-05-09 PROCEDURE — 86901 BLOOD TYPING SEROLOGIC RH(D): CPT

## 2020-05-09 PROCEDURE — 36415 COLL VENOUS BLD VENIPUNCTURE: CPT

## 2020-05-09 PROCEDURE — 76805 OB US >/= 14 WKS SNGL FETUS: CPT

## 2020-05-09 PROCEDURE — 86900 BLOOD TYPING SEROLOGIC ABO: CPT

## 2020-05-09 NOTE — EMERGENCY DEPARTMENT REPORT
<ALBANIA MURRIETA - Last Filed: 20 18:16>





ED Pregnancy HPI





- General


Chief complaint: Vaginal Bleeding


Stated complaint: 12 WKS PREGNANT/BLEEDING/CRAMPS


Time Seen by Provider: 20 16:35


Source: patient


Mode of arrival: Ambulatory


Limitations: No Limitations





- History of Present Illness


Initial comments: 





29-year-old -American female who is approximately 15 weeks pregnant 

presents to the emergency room for vaginal spotting and cramping.  Patient 

states that she is seeing light pink on underwear tissue Monday she also 

complains of constipation.  She is  3 para 3 patient is seen by [facility] in [place]. 


MD Complaint: vaginal bleeding


Severity scale (0 -10): 5


Quality: cramping


Consistency: intermittent


Improves with: none


Worsens with: none


Associated symptoms: vaginal bleeding, abdominal pain.  denies: nausea/vomiting,

vaginal discharge, headache, shortness of breath


Vaginal bleeding: light


Pregnant:: Yes


Number of weeks pregnant: 15





- Related Data


: 3


Para: 3


                                  Previous Rx's











 Medication  Instructions  Recorded  Last Taken  Type


 


Prednisone 20 mg PO QDAY #5 tablet 10/24/13 Unknown Rx


 


Triamcinolone 0.1% [Kenalog 0.1% 1 applic TP TID #60 tube 10/24/13 Unknown Rx





CREAM]    


 


hydrOXYzine HCL [Atarax] 25 mg PO Q6HR PRN #30 tablet 10/24/13 Unknown Rx


 


Benzonatate [Tessalon Perles] 100 mg PO Q8HR #30 capsule 17 Unknown Rx


 


cephALEXin [Keflex] 500 mg PO Q12HR #14 cap 17 Unknown Rx


 


Fluticasone [Flonase] 1 spray NS QDAY 14 Days #1 bottle 18 Unknown Rx


 


Ibuprofen [Motrin 600 MG tab] 600 mg PO Q8H PRN #15 tablet 18 Unknown Rx


 


Loratadine (Nf) [Claritin (Nf)] 10 mg PO DAILY 15 Days #15 tablet 18 

Unknown Rx


 


cephALEXin [Keflex] 500 mg PO Q8HR 10 Days #30 cap 18 Unknown Rx


 


Fluconazole [Diflucan TAB] 150 mg PO ONCE #1 tablet 10/13/19 Unknown Rx


 


Amoxicillin [Amoxicillin TAB] 875 mg PO BID 7 Days #14 tablet 20 Unknown 

Rx


 


Ondansetron [Zofran ODT TAB] 4 mg PO Q6HR PRN #20 tab.rapdis 20 Unknown Rx











                                    Allergies











Allergy/AdvReac Type Severity Reaction Status Date / Time


 


No Known Allergies Allergy   Unverified 10/24/13 08:46














ED Review of Systems


Comment: All other systems reviewed and negative





ED Past Medical Hx





- Past Medical History


Previous Medical History?: Yes


Hx Hypertension: No


Hx CVA: No


Hx Heart Attack/AMI: No


Hx Congestive Heart Failure: No


Hx Diabetes: No


Hx Deep Vein Thrombosis: No


Hx Pulmonary Embolism: No


Hx GERD: No


Hx Liver Disease: No


Hx Renal Disease: No


Hx of Cancer: No


Hx Sickle Cell Disease: No


Hx Arthritis: No


Hx Headaches / Migraines: No


Hx Seizures: No


Hx Kidney Stones: No


Hx Psychiatric Treatment: No


Hx Asthma: No


Hx COPD: No


Hx Tuberculosis: No


Hx Dementia: No


Hx HIV: No


Additional medical history: hyperthyroidism, eczema, Low BP





- Surgical History


Past Surgical History?: Yes


Hx Coronary Stent: No


Hx Open Heart Surgery: No


Hx Pacemaker: No


Hx Internal Defibrillator: No


Hx Cholecystectomy: No


Hx Appendectomy: No


Hx Breast Surgery: No


Additional Surgical History:  x 2





- Social History


Smoking Status: Never Smoker


Substance Use Type: None





- Medications


Home Medications: 


                                Home Medications











 Medication  Instructions  Recorded  Confirmed  Last Taken  Type


 


Prednisone 20 mg PO QDAY #5 tablet 10/24/13  Unknown Rx


 


Triamcinolone 0.1% [Kenalog 0.1% 1 applic TP TID #60 tube 10/24/13  Unknown Rx





CREAM]     


 


hydrOXYzine HCL [Atarax] 25 mg PO Q6HR PRN #30 tablet 10/24/13  Unknown Rx


 


Benzonatate [Tessalon Perles] 100 mg PO Q8HR #30 capsule 17  Unknown Rx


 


cephALEXin [Keflex] 500 mg PO Q12HR #14 cap 17  Unknown Rx


 


Fluticasone [Flonase] 1 spray NS QDAY 14 Days #1 bottle 18  Unknown Rx


 


Ibuprofen [Motrin 600 MG tab] 600 mg PO Q8H PRN #15 tablet 18  Unknown Rx


 


Loratadine (Nf) [Claritin (Nf)] 10 mg PO DAILY 15 Days #15 tablet 18  

Unknown Rx


 


cephALEXin [Keflex] 500 mg PO Q8HR 10 Days #30 cap 18  Unknown Rx


 


Fluconazole [Diflucan TAB] 150 mg PO ONCE #1 tablet 10/13/19  Unknown Rx


 


Amoxicillin [Amoxicillin TAB] 875 mg PO BID 7 Days #14 tablet 20  Unknown 

Rx


 


Ondansetron [Zofran ODT TAB] 4 mg PO Q6HR PRN #20 tab.rapdis 20  Unknown 

Rx














ED Physical Exam





- General


Limitations: No Limitations


General appearance: alert, in no apparent distress





- Head


Head exam: Present: atraumatic, normocephalic





- Eye


Eye exam: Present: normal appearance





- ENT


ENT exam: Present: mucous membranes moist





- GI/Abdominal


GI/Abdominal exam: Present: soft, normal bowel sounds





- Back Exam


Back exam: Present: normal inspection, full ROM





- Neurological Exam


Neurological exam: Present: alert, oriented X3, normal gait





- Psychiatric


Psychiatric exam: Present: normal affect, normal mood





- Skin


Skin exam: Present: warm, dry, intact, normal color.  Absent: rash





ED Medical Decision Making





- Lab Data


Result diagrams: 


                                 20 16:30








- Medical Decision Making





29-year-old -American female who is approximately 15 weeks pregnant 

presents to the emergency room for vaginal spotting and cramping.  Patient sta

debra that she is seeing light pink on underwear tissue Monday she also complains 

of constipation.  She is  3 para 3 patient is seen by [facility] in [place]. 











Awaiting ultrasound





ED Disposition


Clinical Impression: 


 Vaginal spotting, Normal pregnancy in second trimester





Disposition: DC- TO HOME OR SELFCARE


Condition: Stable


Instructions:  Pregnancy (ED), Threatened Miscarriage (ED)


Additional Instructions: 


Make sure to follow up with the OB/GYN as discussed.


Take all your medications as you've been prescribed.


If you have any worsening symptoms or develop new symptoms please return to ED 

immediately.


Referrals: 


PRIMARY CARE,MD [Referring] - 3-5 Days


[facility] - [facility]-5 Days


Forms:  Accompanied Note, Work/School Release Form(ED)





<GUSTAVO VICENTE - Last Filed: 20 20:35>





ED Review of Systems


ROS: 


Stated complaint: 12 WKS PREGNANT/BLEEDING/CRAMPS


Other details as noted in HPI








ED Course





                                   Vital Signs











  20





  16:23


 


Temperature 98.1 F


 


Pulse Rate 101 H


 


Respiratory 13





Rate 


 


Blood Pressure 111/69


 


O2 Sat by Pulse 100





Oximetry 














ED Medical Decision Making





- Lab Data


Result diagrams: 


                                 20 16:30








- Radiology Data


Radiology results: report reviewed, image reviewed








 ULTRASOUND OBSTETRIC 





INDICATION / CLINICAL INFORMATION: 


Abdominal cramping and spotting. 


Clinical Gestational Age (GA): 15.5 weeks.days 





TECHNIQUE: 


Transabdominal. 





COMPARISON: 


Ultrasound dated 20 





FINDINGS: 


There is a single intrauterine pregnancy. 





Biparietal Diameter = 3.2 cm = 16.0 weeks.days 


Head Circumference = 12.0 cm = 16.0 weeks.days 


Abdominal Circumference = 10.0 cm = 16.0 weeks.days 


Femur Length = 2.0 cm = 16.0 weeks.days 


Average Ultrasound Age (AUA) = 16.0 weeks.days 





Fetal Heart Rate: 157 beats per minute. 


Estimated Fetal Birth Weight in grams (if calculated): 143 g 


Estimated Fetal Weight Growth Percentile (if calculated): 63% 





Fetal Position: transverse. 


Cervix: closed. Length in cm (if measured): 4.5 


Placenta: anterior and free of the os. 


Amniotic Fluid Volume: Subjectively normal 


Amniotic Fluid Index (MANUEL) in cm (if calculated): Not calculated. 


Maternal Adnexa: No significant abnormality. 





IMPRESSION: 


1. Single, living intrauterine pregnancy with estimated sonographic age of 16.0 

weeks.days 


2. No acute sonographic abnormality. 





Signer Name: JOSE ANTONIO Jyoner MD 


Signed: 2020 7:36 PM 


Workstation Name: [id number] 








 Transcribed By: DT 


 Dictated By: Filippo Joyner MD 


 Electronically Authenticated By: Filippo Joyner MD 


 Signed Date/Time: 20 











- Medical Decision Making


Ultrasound report shows no abnormalities.


Discussed this with the patient discussed with patient to follow-up with her OB

/GYN.


Discussed with patient to avoid intercourse until follow-up with her OB/GYN.


Discussed with with the patient that if symptoms worsen she may return to the ED

immediately.


Vital signs are normal patient understands instructions and she is to follow-up.


Critical care attestation.: 


If time is entered above; I have spent that time in minutes in the direct care 

of this critically ill patient, excluding procedure time.








ED Disposition


Is pt being admited?: No


Does the pt Need Aspirin: No


Time of Disposition: 20:33

## 2020-05-09 NOTE — ULTRASOUND REPORT
ULTRASOUND OBSTETRIC 



INDICATION / CLINICAL INFORMATION:

Abdominal cramping and spotting.

Clinical Gestational Age (GA): 15.5 weeks.days



TECHNIQUE:

Transabdominal.



COMPARISON:

Ultrasound dated 04/09/20



FINDINGS:

There is a single intrauterine pregnancy. 



Biparietal Diameter = 3.2 cm = 16.0 weeks.days

Head Circumference = 12.0 cm = 16.0 weeks.days

Abdominal Circumference = 10.0 cm = 16.0 weeks.days

Femur Length = 2.0 cm = 16.0 weeks.days

Average Ultrasound Age (AUA) = 16.0 weeks.days



Fetal Heart Rate: 157  beats per minute. 

Estimated Fetal Birth Weight in grams (if calculated): 143 g

Estimated Fetal Weight Growth Percentile (if calculated): 63%



Fetal Position: transverse. 

Cervix: closed.  Length in cm (if measured): 4.5

Placenta: anterior and free of the os.

Amniotic Fluid Volume: Subjectively normal 

Amniotic Fluid Index (MANUEL) in cm (if calculated): Not calculated.

Maternal Adnexa: No significant abnormality.



IMPRESSION:

1. Single, living intrauterine pregnancy with estimated sonographic age of 16.0 weeks.days

2. No acute sonographic abnormality.



Signer Name: JOSE ANTONIO Joyner MD 

Signed: 5/9/2020 7:36 PM

Workstation Name: [id number]

## 2020-05-12 VITALS — DIASTOLIC BLOOD PRESSURE: 52 MMHG | SYSTOLIC BLOOD PRESSURE: 98 MMHG
